# Patient Record
Sex: FEMALE | Race: BLACK OR AFRICAN AMERICAN | NOT HISPANIC OR LATINO | URBAN - METROPOLITAN AREA
[De-identification: names, ages, dates, MRNs, and addresses within clinical notes are randomized per-mention and may not be internally consistent; named-entity substitution may affect disease eponyms.]

---

## 2018-04-22 ENCOUNTER — EMERGENCY (EMERGENCY)
Facility: HOSPITAL | Age: 53
LOS: 0 days | Discharge: HOME | End: 2018-04-22
Attending: EMERGENCY MEDICINE
Payer: COMMERCIAL

## 2018-04-22 VITALS
HEART RATE: 77 BPM | SYSTOLIC BLOOD PRESSURE: 144 MMHG | OXYGEN SATURATION: 100 % | RESPIRATION RATE: 18 BRPM | HEIGHT: 66 IN | TEMPERATURE: 97 F | WEIGHT: 175.05 LBS | DIASTOLIC BLOOD PRESSURE: 86 MMHG

## 2018-04-22 DIAGNOSIS — J45.909 UNSPECIFIED ASTHMA, UNCOMPLICATED: ICD-10-CM

## 2018-04-22 DIAGNOSIS — S39.012A STRAIN OF MUSCLE, FASCIA AND TENDON OF LOWER BACK, INITIAL ENCOUNTER: ICD-10-CM

## 2018-04-22 DIAGNOSIS — Z79.51 LONG TERM (CURRENT) USE OF INHALED STEROIDS: ICD-10-CM

## 2018-04-22 DIAGNOSIS — Z04.1 ENCOUNTER FOR EXAMINATION AND OBSERVATION FOLLOWING TRANSPORT ACCIDENT: ICD-10-CM

## 2018-04-22 DIAGNOSIS — M54.5 LOW BACK PAIN: ICD-10-CM
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
## 2018-04-22 PROBLEM — Z00.00 ENCOUNTER FOR PREVENTIVE HEALTH EXAMINATION: Status: ACTIVE | Noted: 2018-04-22

## 2018-04-22 PROCEDURE — 99283 EMERGENCY DEPT VISIT LOW MDM: CPT

## 2018-04-22 RX ORDER — IBUPROFEN 200 MG
1 TABLET ORAL
Qty: 15 | Refills: 0 | OUTPATIENT
Start: 2018-04-22 | End: 2018-04-26

## 2018-04-22 RX ORDER — METHOCARBAMOL 500 MG/1
1 TABLET, FILM COATED ORAL
Qty: 15 | Refills: 0 | OUTPATIENT
Start: 2018-04-22 | End: 2018-04-26

## 2018-04-22 RX ORDER — IBUPROFEN 200 MG
600 TABLET ORAL ONCE
Qty: 0 | Refills: 0 | Status: COMPLETED | OUTPATIENT
Start: 2018-04-22 | End: 2018-04-22

## 2018-04-22 RX ORDER — METHOCARBAMOL 500 MG/1
1000 TABLET, FILM COATED ORAL ONCE
Qty: 0 | Refills: 0 | Status: COMPLETED | OUTPATIENT
Start: 2018-04-22 | End: 2018-04-22

## 2018-04-22 RX ADMIN — Medication 600 MILLIGRAM(S): at 20:56

## 2018-04-22 RX ADMIN — METHOCARBAMOL 1000 MILLIGRAM(S): 500 TABLET, FILM COATED ORAL at 20:56
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
## 2018-04-22 NOTE — ED PROVIDER NOTE - CONSTITUTIONAL, MLM
normal... Well appearing, well nourished, awake, alert, oriented to person, place, time/situation and in no apparent distress. Speaking in clear full sentences no nasal flaring no shoulders retractions not holding her head/chest/abdomen/back, appears very comfortable sitting up in the chair in a bright light room

## 2018-04-22 NOTE — ED PROVIDER NOTE - MEDICAL DECISION MAKING DETAILS
hx, exam, Pt has no point bony tenderness no xrays of cervical, thoracic and lumbar spines are indicated

## 2018-04-22 NOTE — ED PROVIDER NOTE - EXTREMITY EXAM
no deformity, pain or tenderness, no restriction of movement/Pt is able to flex and extend all joints against resistance of all extremities

## 2018-11-20 ENCOUNTER — TRANSCRIPTION ENCOUNTER (OUTPATIENT)
Age: 53
End: 2018-11-20

## 2018-11-20 ENCOUNTER — EMERGENCY (EMERGENCY)
Facility: HOSPITAL | Age: 53
LOS: 1 days | Discharge: HOME | End: 2018-11-20
Attending: EMERGENCY MEDICINE | Admitting: EMERGENCY MEDICINE
Payer: COMMERCIAL

## 2018-11-20 VITALS
OXYGEN SATURATION: 98 % | HEART RATE: 81 BPM | RESPIRATION RATE: 16 BRPM | SYSTOLIC BLOOD PRESSURE: 111 MMHG | TEMPERATURE: 97 F | DIASTOLIC BLOOD PRESSURE: 71 MMHG

## 2018-11-20 VITALS
RESPIRATION RATE: 16 BRPM | HEART RATE: 72 BPM | DIASTOLIC BLOOD PRESSURE: 94 MMHG | TEMPERATURE: 98 F | SYSTOLIC BLOOD PRESSURE: 151 MMHG | OXYGEN SATURATION: 99 % | HEIGHT: 66 IN | WEIGHT: 171.96 LBS

## 2018-11-20 PROBLEM — J45.909 UNSPECIFIED ASTHMA, UNCOMPLICATED: Chronic | Status: ACTIVE | Noted: 2018-04-22

## 2018-11-20 LAB
ALBUMIN SERPL ELPH-MCNC: 3.3 G/DL — SIGNIFICANT CHANGE UP (ref 3.3–5)
ALP SERPL-CCNC: 70 U/L — SIGNIFICANT CHANGE UP (ref 30–120)
ALT FLD-CCNC: 21 U/L DA — SIGNIFICANT CHANGE UP (ref 10–60)
ANION GAP SERPL CALC-SCNC: 8 MMOL/L — SIGNIFICANT CHANGE UP (ref 5–17)
APPEARANCE UR: CLEAR — SIGNIFICANT CHANGE UP
AST SERPL-CCNC: 18 U/L — SIGNIFICANT CHANGE UP (ref 10–40)
BACTERIA # UR AUTO: ABNORMAL
BASOPHILS # BLD AUTO: 0.02 K/UL — SIGNIFICANT CHANGE UP (ref 0–0.2)
BASOPHILS NFR BLD AUTO: 0.3 % — SIGNIFICANT CHANGE UP (ref 0–2)
BILIRUB SERPL-MCNC: 0.2 MG/DL — SIGNIFICANT CHANGE UP (ref 0.2–1.2)
BILIRUB UR-MCNC: NEGATIVE — SIGNIFICANT CHANGE UP
BUN SERPL-MCNC: 19 MG/DL — SIGNIFICANT CHANGE UP (ref 7–23)
CALCIUM SERPL-MCNC: 9 MG/DL — SIGNIFICANT CHANGE UP (ref 8.4–10.5)
CHLORIDE SERPL-SCNC: 104 MMOL/L — SIGNIFICANT CHANGE UP (ref 96–108)
CO2 SERPL-SCNC: 29 MMOL/L — SIGNIFICANT CHANGE UP (ref 22–31)
COLOR SPEC: YELLOW — SIGNIFICANT CHANGE UP
CREAT SERPL-MCNC: 0.88 MG/DL — SIGNIFICANT CHANGE UP (ref 0.5–1.3)
DIFF PNL FLD: ABNORMAL
EOSINOPHIL # BLD AUTO: 0.22 K/UL — SIGNIFICANT CHANGE UP (ref 0–0.5)
EOSINOPHIL NFR BLD AUTO: 3.3 % — SIGNIFICANT CHANGE UP (ref 0–6)
EPI CELLS # UR: ABNORMAL
GLUCOSE SERPL-MCNC: 88 MG/DL — SIGNIFICANT CHANGE UP (ref 70–99)
GLUCOSE UR QL: NEGATIVE MG/DL — SIGNIFICANT CHANGE UP
HCT VFR BLD CALC: 36.5 % — SIGNIFICANT CHANGE UP (ref 34.5–45)
HGB BLD-MCNC: 12.1 G/DL — SIGNIFICANT CHANGE UP (ref 11.5–15.5)
IMM GRANULOCYTES NFR BLD AUTO: 0.1 % — SIGNIFICANT CHANGE UP (ref 0–1.5)
KETONES UR-MCNC: ABNORMAL
LEUKOCYTE ESTERASE UR-ACNC: ABNORMAL
LIDOCAIN IGE QN: 150 U/L — SIGNIFICANT CHANGE UP (ref 73–393)
LYMPHOCYTES # BLD AUTO: 3.16 K/UL — SIGNIFICANT CHANGE UP (ref 1–3.3)
LYMPHOCYTES # BLD AUTO: 47.2 % — HIGH (ref 13–44)
MCHC RBC-ENTMCNC: 31.1 PG — SIGNIFICANT CHANGE UP (ref 27–34)
MCHC RBC-ENTMCNC: 33.2 GM/DL — SIGNIFICANT CHANGE UP (ref 32–36)
MCV RBC AUTO: 93.8 FL — SIGNIFICANT CHANGE UP (ref 80–100)
MONOCYTES # BLD AUTO: 0.57 K/UL — SIGNIFICANT CHANGE UP (ref 0–0.9)
MONOCYTES NFR BLD AUTO: 8.5 % — SIGNIFICANT CHANGE UP (ref 2–14)
NEUTROPHILS # BLD AUTO: 2.71 K/UL — SIGNIFICANT CHANGE UP (ref 1.8–7.4)
NEUTROPHILS NFR BLD AUTO: 40.6 % — LOW (ref 43–77)
NITRITE UR-MCNC: NEGATIVE — SIGNIFICANT CHANGE UP
NRBC # BLD: 0 /100 WBCS — SIGNIFICANT CHANGE UP (ref 0–0)
OB PNL STL: NEGATIVE — SIGNIFICANT CHANGE UP
PH UR: 5 — SIGNIFICANT CHANGE UP (ref 5–8)
PLATELET # BLD AUTO: 288 K/UL — SIGNIFICANT CHANGE UP (ref 150–400)
POTASSIUM SERPL-MCNC: 3.4 MMOL/L — LOW (ref 3.5–5.3)
POTASSIUM SERPL-SCNC: 3.4 MMOL/L — LOW (ref 3.5–5.3)
PROT SERPL-MCNC: 8 G/DL — SIGNIFICANT CHANGE UP (ref 6–8.3)
PROT UR-MCNC: 15 MG/DL
RBC # BLD: 3.89 M/UL — SIGNIFICANT CHANGE UP (ref 3.8–5.2)
RBC # FLD: 12.4 % — SIGNIFICANT CHANGE UP (ref 10.3–14.5)
RBC CASTS # UR COMP ASSIST: SIGNIFICANT CHANGE UP /HPF (ref 0–4)
SODIUM SERPL-SCNC: 141 MMOL/L — SIGNIFICANT CHANGE UP (ref 135–145)
SP GR SPEC: 1.02 — SIGNIFICANT CHANGE UP (ref 1.01–1.02)
UROBILINOGEN FLD QL: NEGATIVE MG/DL — SIGNIFICANT CHANGE UP
WBC # BLD: 6.69 K/UL — SIGNIFICANT CHANGE UP (ref 3.8–10.5)
WBC # FLD AUTO: 6.69 K/UL — SIGNIFICANT CHANGE UP (ref 3.8–10.5)
WBC UR QL: SIGNIFICANT CHANGE UP

## 2018-11-20 PROCEDURE — 93005 ELECTROCARDIOGRAM TRACING: CPT

## 2018-11-20 PROCEDURE — 93010 ELECTROCARDIOGRAM REPORT: CPT

## 2018-11-20 PROCEDURE — 83690 ASSAY OF LIPASE: CPT

## 2018-11-20 PROCEDURE — 36415 COLL VENOUS BLD VENIPUNCTURE: CPT

## 2018-11-20 PROCEDURE — 96375 TX/PRO/DX INJ NEW DRUG ADDON: CPT

## 2018-11-20 PROCEDURE — 80053 COMPREHEN METABOLIC PANEL: CPT

## 2018-11-20 PROCEDURE — 99284 EMERGENCY DEPT VISIT MOD MDM: CPT

## 2018-11-20 PROCEDURE — 96374 THER/PROPH/DIAG INJ IV PUSH: CPT | Mod: 59

## 2018-11-20 PROCEDURE — 96361 HYDRATE IV INFUSION ADD-ON: CPT

## 2018-11-20 PROCEDURE — 85027 COMPLETE CBC AUTOMATED: CPT

## 2018-11-20 PROCEDURE — 74177 CT ABD & PELVIS W/CONTRAST: CPT

## 2018-11-20 PROCEDURE — 81001 URINALYSIS AUTO W/SCOPE: CPT

## 2018-11-20 PROCEDURE — 74177 CT ABD & PELVIS W/CONTRAST: CPT | Mod: 26

## 2018-11-20 PROCEDURE — 82272 OCCULT BLD FECES 1-3 TESTS: CPT

## 2018-11-20 PROCEDURE — 99284 EMERGENCY DEPT VISIT MOD MDM: CPT | Mod: 25

## 2018-11-20 RX ORDER — CEFUROXIME AXETIL 250 MG
250 TABLET ORAL ONCE
Qty: 0 | Refills: 0 | Status: DISCONTINUED | OUTPATIENT
Start: 2018-11-20 | End: 2018-11-20

## 2018-11-20 RX ORDER — PANTOPRAZOLE SODIUM 20 MG/1
40 TABLET, DELAYED RELEASE ORAL ONCE
Qty: 0 | Refills: 0 | Status: COMPLETED | OUTPATIENT
Start: 2018-11-20 | End: 2018-11-20

## 2018-11-20 RX ORDER — ONDANSETRON 8 MG/1
4 TABLET, FILM COATED ORAL ONCE
Qty: 0 | Refills: 0 | Status: COMPLETED | OUTPATIENT
Start: 2018-11-20 | End: 2018-11-20

## 2018-11-20 RX ORDER — CEFUROXIME AXETIL 250 MG
1 TABLET ORAL
Qty: 10 | Refills: 0 | OUTPATIENT
Start: 2018-11-20 | End: 2018-11-24

## 2018-11-20 RX ORDER — SODIUM CHLORIDE 9 MG/ML
1000 INJECTION INTRAMUSCULAR; INTRAVENOUS; SUBCUTANEOUS ONCE
Qty: 0 | Refills: 0 | Status: COMPLETED | OUTPATIENT
Start: 2018-11-20 | End: 2018-11-20

## 2018-11-20 RX ORDER — CEFUROXIME AXETIL 250 MG
500 TABLET ORAL EVERY 12 HOURS
Qty: 0 | Refills: 0 | Status: DISCONTINUED | OUTPATIENT
Start: 2018-11-20 | End: 2018-11-24

## 2018-11-20 RX ADMIN — SODIUM CHLORIDE 1000 MILLILITER(S): 9 INJECTION INTRAMUSCULAR; INTRAVENOUS; SUBCUTANEOUS at 12:00

## 2018-11-20 RX ADMIN — Medication 500 MILLIGRAM(S): at 14:21

## 2018-11-20 RX ADMIN — ONDANSETRON 4 MILLIGRAM(S): 8 TABLET, FILM COATED ORAL at 10:56

## 2018-11-20 RX ADMIN — SODIUM CHLORIDE 1000 MILLILITER(S): 9 INJECTION INTRAMUSCULAR; INTRAVENOUS; SUBCUTANEOUS at 10:56

## 2018-11-20 RX ADMIN — PANTOPRAZOLE SODIUM 40 MILLIGRAM(S): 20 TABLET, DELAYED RELEASE ORAL at 10:56

## 2018-11-20 NOTE — ED PROVIDER NOTE - OBJECTIVE STATEMENT
54 y/o F with hx of asthma, HTN presents with c/o epigastric pain x 3 days. Pt states that she has burning sensation to her epigastric region and now to her LUQ, mild pain to upper back but non radiating. Pt admits to nausea. States that she noticed bright red blood upon wiping yesterday, initially stated that it was from the rectum after BM but in the ED noticed it was from her vagina. States that she saw her GYN last year, lmp was when she was 44 y/o and had a colonoscopy and endoscopy earlier this year with normal results as per pt. Pt denies fever, chills, v/d/constipation, CP, SOB, hx of abdominal surgeries, recent travel, sick contacts, dysuria, flank pain, blood in stool or other symptoms.  PMD: Dr. Don 52 y/o F with hx of asthma, HTN presents with c/o epigastric pain x 3 days. Pt states that she has burning sensation to her epigastric region and now to her LUQ, mild pain to upper back but non radiating. Pt admits to nausea. States that she noticed bright red blood upon wiping yesterday, initially stated that it was from the rectum after bowel movement but in the ED thinks it was from her vagina. States that she saw her GYN this year, lmp was when she was 48 y/o and had a colonoscopy and endoscopy earlier this year with normal results as per pt. Pt denies fever, chills, v/d/constipation, CP, SOB, hx of abdominal surgeries, recent travel, sick contacts, dysuria, flank pain, blood in stool or other symptoms.  PMD: Dr. Don

## 2018-11-20 NOTE — ED ADULT NURSE NOTE - ED COMFORT CARE
Patient informed Erythromycin Pregnancy And Lactation Text: This medication is Pregnancy Category B and is considered safe during pregnancy. It is also excreted in breast milk.

## 2018-11-20 NOTE — ED ADULT TRIAGE NOTE - CHIEF COMPLAINT QUOTE
" I have burning sensation in my epigastric area started yesterday, I saw blood on the tissue when I wiped after a BM, my upper back, my right side back/ flank pain "

## 2018-11-20 NOTE — ED PROVIDER NOTE - CARE PLAN
Principal Discharge DX:	Epigastric pain Principal Discharge DX:	Abdominal pain  Secondary Diagnosis:	Hematuria  Secondary Diagnosis:	Rectal bleeding

## 2018-11-20 NOTE — ED PROVIDER NOTE - MEDICAL DECISION MAKING DETAILS
54 y/o F with epigastric pain x 3 days with nausea and LUQ pain, also noticed blood from vaginal upon wiping since last night, no fever/vomiting/CP/SOB, will get ekg, labs, ua, pelvic exam, ct a/p, re-assess 54 y/o F with epigastric pain x 3 days with nausea and LUQ pain, also noticed blood from ?vagina upon wiping since last night, no fever/vomiting/CP/SOB, will get ekg, labs, ua, pelvic exam, ct a/p, re-assess

## 2018-11-20 NOTE — ED PROVIDER NOTE - ATTENDING CONTRIBUTION TO CARE
pt c/o epigastric abd pain and brbpr starting yest. pt reports saw brbpr when wiping. + mild nausea. no fevers, chills, ha, vomiting, cp, sob, constipation, diarrhea.  wd, wn female, nad, s1s2 rrr, lungs cta, abd soft, tender epigastric and luq, nt ruq, rlq, llq

## 2018-11-20 NOTE — ED ADULT NURSE NOTE - CHPI ED NUR SYMPTOMS NEG
no dysuria/no chills/no fever/no hematuria/no abdominal distension/no vomiting/no diarrhea/no burning urination

## 2018-11-20 NOTE — ED ADULT NURSE NOTE - OBJECTIVE STATEMENT
pt c.o epigastric pain, radiating to back and right shoulder, pt c/o slight nausea. denies any chest pain

## 2019-02-14 ENCOUNTER — TRANSCRIPTION ENCOUNTER (OUTPATIENT)
Age: 54
End: 2019-02-14

## 2019-02-14 ENCOUNTER — EMERGENCY (EMERGENCY)
Facility: HOSPITAL | Age: 54
LOS: 1 days | Discharge: ANOTHER HOSPITAL | End: 2019-02-14
Attending: EMERGENCY MEDICINE | Admitting: EMERGENCY MEDICINE
Payer: COMMERCIAL

## 2019-02-14 ENCOUNTER — INPATIENT (INPATIENT)
Facility: HOSPITAL | Age: 54
LOS: 0 days | Discharge: HOME | DRG: 287 | End: 2019-02-14
Attending: INTERNAL MEDICINE | Admitting: INTERNAL MEDICINE
Payer: COMMERCIAL

## 2019-02-14 VITALS
WEIGHT: 175.05 LBS | OXYGEN SATURATION: 98 % | HEART RATE: 86 BPM | TEMPERATURE: 99 F | HEIGHT: 66 IN | RESPIRATION RATE: 20 BRPM | SYSTOLIC BLOOD PRESSURE: 139 MMHG | DIASTOLIC BLOOD PRESSURE: 85 MMHG

## 2019-02-14 VITALS
HEIGHT: 66 IN | TEMPERATURE: 98 F | SYSTOLIC BLOOD PRESSURE: 125 MMHG | OXYGEN SATURATION: 97 % | DIASTOLIC BLOOD PRESSURE: 84 MMHG | RESPIRATION RATE: 20 BRPM | HEART RATE: 88 BPM | WEIGHT: 175.05 LBS

## 2019-02-14 VITALS
DIASTOLIC BLOOD PRESSURE: 64 MMHG | HEART RATE: 82 BPM | OXYGEN SATURATION: 99 % | SYSTOLIC BLOOD PRESSURE: 102 MMHG | RESPIRATION RATE: 14 BRPM

## 2019-02-14 DIAGNOSIS — I21.4 NON-ST ELEVATION (NSTEMI) MYOCARDIAL INFARCTION: ICD-10-CM

## 2019-02-14 PROBLEM — I10 ESSENTIAL (PRIMARY) HYPERTENSION: Chronic | Status: ACTIVE | Noted: 2018-11-20

## 2019-02-14 LAB
ALBUMIN SERPL ELPH-MCNC: 3.2 G/DL — LOW (ref 3.3–5)
ALP SERPL-CCNC: 90 U/L — SIGNIFICANT CHANGE UP (ref 40–120)
ALT FLD-CCNC: 17 U/L — SIGNIFICANT CHANGE UP (ref 12–78)
ANION GAP SERPL CALC-SCNC: 7 MMOL/L — SIGNIFICANT CHANGE UP (ref 5–17)
APPEARANCE UR: CLEAR — SIGNIFICANT CHANGE UP
APTT BLD: 30.2 SEC — SIGNIFICANT CHANGE UP (ref 27.5–36.3)
AST SERPL-CCNC: 20 U/L — SIGNIFICANT CHANGE UP (ref 15–37)
BASOPHILS # BLD AUTO: 0.01 K/UL — SIGNIFICANT CHANGE UP (ref 0–0.2)
BASOPHILS NFR BLD AUTO: 0.1 % — SIGNIFICANT CHANGE UP (ref 0–2)
BILIRUB SERPL-MCNC: 0.5 MG/DL — SIGNIFICANT CHANGE UP (ref 0.2–1.2)
BILIRUB UR-MCNC: NEGATIVE — SIGNIFICANT CHANGE UP
BUN SERPL-MCNC: 14 MG/DL — SIGNIFICANT CHANGE UP (ref 7–23)
CALCIUM SERPL-MCNC: 8.5 MG/DL — SIGNIFICANT CHANGE UP (ref 8.5–10.1)
CHLORIDE SERPL-SCNC: 105 MMOL/L — SIGNIFICANT CHANGE UP (ref 96–108)
CO2 SERPL-SCNC: 28 MMOL/L — SIGNIFICANT CHANGE UP (ref 22–31)
COLOR SPEC: YELLOW — SIGNIFICANT CHANGE UP
CREAT SERPL-MCNC: 0.79 MG/DL — SIGNIFICANT CHANGE UP (ref 0.5–1.3)
D DIMER BLD IA.RAPID-MCNC: 252 NG/ML DDU — HIGH
DIFF PNL FLD: ABNORMAL
EOSINOPHIL # BLD AUTO: 0.22 K/UL — SIGNIFICANT CHANGE UP (ref 0–0.5)
EOSINOPHIL NFR BLD AUTO: 2.7 % — SIGNIFICANT CHANGE UP (ref 0–6)
FLU A RESULT: SIGNIFICANT CHANGE UP
FLU A RESULT: SIGNIFICANT CHANGE UP
FLUAV AG NPH QL: SIGNIFICANT CHANGE UP
FLUBV AG NPH QL: SIGNIFICANT CHANGE UP
GLUCOSE SERPL-MCNC: 97 MG/DL — SIGNIFICANT CHANGE UP (ref 70–99)
GLUCOSE UR QL: NEGATIVE — SIGNIFICANT CHANGE UP
HCT VFR BLD CALC: 38 % — SIGNIFICANT CHANGE UP (ref 34.5–45)
HGB BLD-MCNC: 12.6 G/DL — SIGNIFICANT CHANGE UP (ref 11.5–15.5)
IMM GRANULOCYTES NFR BLD AUTO: 0.2 % — SIGNIFICANT CHANGE UP (ref 0–1.5)
INR BLD: 1.08 RATIO — SIGNIFICANT CHANGE UP (ref 0.88–1.16)
KETONES UR-MCNC: NEGATIVE — SIGNIFICANT CHANGE UP
LEUKOCYTE ESTERASE UR-ACNC: NEGATIVE — SIGNIFICANT CHANGE UP
LYMPHOCYTES # BLD AUTO: 1.5 K/UL — SIGNIFICANT CHANGE UP (ref 1–3.3)
LYMPHOCYTES # BLD AUTO: 18.1 % — SIGNIFICANT CHANGE UP (ref 13–44)
MAGNESIUM SERPL-MCNC: 1.9 MG/DL — SIGNIFICANT CHANGE UP (ref 1.6–2.6)
MCHC RBC-ENTMCNC: 30.9 PG — SIGNIFICANT CHANGE UP (ref 27–34)
MCHC RBC-ENTMCNC: 33.2 GM/DL — SIGNIFICANT CHANGE UP (ref 32–36)
MCV RBC AUTO: 93.1 FL — SIGNIFICANT CHANGE UP (ref 80–100)
MONOCYTES # BLD AUTO: 0.76 K/UL — SIGNIFICANT CHANGE UP (ref 0–0.9)
MONOCYTES NFR BLD AUTO: 9.2 % — SIGNIFICANT CHANGE UP (ref 2–14)
NEUTROPHILS # BLD AUTO: 5.79 K/UL — SIGNIFICANT CHANGE UP (ref 1.8–7.4)
NEUTROPHILS NFR BLD AUTO: 69.7 % — SIGNIFICANT CHANGE UP (ref 43–77)
NITRITE UR-MCNC: NEGATIVE — SIGNIFICANT CHANGE UP
NRBC # BLD: 0 /100 WBCS — SIGNIFICANT CHANGE UP (ref 0–0)
NT-PROBNP SERPL-SCNC: 74 PG/ML — SIGNIFICANT CHANGE UP (ref 0–125)
PH UR: 5 — SIGNIFICANT CHANGE UP (ref 5–8)
PLATELET # BLD AUTO: 300 K/UL — SIGNIFICANT CHANGE UP (ref 150–400)
POTASSIUM SERPL-MCNC: 3.6 MMOL/L — SIGNIFICANT CHANGE UP (ref 3.5–5.3)
POTASSIUM SERPL-SCNC: 3.6 MMOL/L — SIGNIFICANT CHANGE UP (ref 3.5–5.3)
PROT SERPL-MCNC: 8.2 G/DL — SIGNIFICANT CHANGE UP (ref 6–8.3)
PROT UR-MCNC: NEGATIVE — SIGNIFICANT CHANGE UP
PROTHROM AB SERPL-ACNC: 12.2 SEC — SIGNIFICANT CHANGE UP (ref 10–12.9)
RBC # BLD: 4.08 M/UL — SIGNIFICANT CHANGE UP (ref 3.8–5.2)
RBC # FLD: 12.5 % — SIGNIFICANT CHANGE UP (ref 10.3–14.5)
RSV RESULT: SIGNIFICANT CHANGE UP
RSV RNA RESP QL NAA+PROBE: SIGNIFICANT CHANGE UP
SODIUM SERPL-SCNC: 140 MMOL/L — SIGNIFICANT CHANGE UP (ref 135–145)
SP GR SPEC: 1.01 — SIGNIFICANT CHANGE UP (ref 1.01–1.02)
TROPONIN I SERPL-MCNC: 0.38 NG/ML — HIGH (ref 0.01–0.04)
TROPONIN I SERPL-MCNC: 0.74 NG/ML — HIGH (ref 0.01–0.04)
TSH SERPL-MCNC: 1.8 UIU/ML — SIGNIFICANT CHANGE UP (ref 0.36–3.74)
UROBILINOGEN FLD QL: NEGATIVE — SIGNIFICANT CHANGE UP
WBC # BLD: 8.3 K/UL — SIGNIFICANT CHANGE UP (ref 3.8–10.5)
WBC # FLD AUTO: 8.3 K/UL — SIGNIFICANT CHANGE UP (ref 3.8–10.5)

## 2019-02-14 PROCEDURE — 87086 URINE CULTURE/COLONY COUNT: CPT

## 2019-02-14 PROCEDURE — 36415 COLL VENOUS BLD VENIPUNCTURE: CPT

## 2019-02-14 PROCEDURE — 71046 X-RAY EXAM CHEST 2 VIEWS: CPT

## 2019-02-14 PROCEDURE — 93005 ELECTROCARDIOGRAM TRACING: CPT

## 2019-02-14 PROCEDURE — 85610 PROTHROMBIN TIME: CPT

## 2019-02-14 PROCEDURE — 99152 MOD SED SAME PHYS/QHP 5/>YRS: CPT

## 2019-02-14 PROCEDURE — 85730 THROMBOPLASTIN TIME PARTIAL: CPT

## 2019-02-14 PROCEDURE — 93458 L HRT ARTERY/VENTRICLE ANGIO: CPT

## 2019-02-14 PROCEDURE — 93458 L HRT ARTERY/VENTRICLE ANGIO: CPT | Mod: 26,GC

## 2019-02-14 PROCEDURE — 71275 CT ANGIOGRAPHY CHEST: CPT | Mod: 26

## 2019-02-14 PROCEDURE — 83735 ASSAY OF MAGNESIUM: CPT

## 2019-02-14 PROCEDURE — 76937 US GUIDE VASCULAR ACCESS: CPT | Mod: 26,GC

## 2019-02-14 PROCEDURE — 94640 AIRWAY INHALATION TREATMENT: CPT

## 2019-02-14 PROCEDURE — 87631 RESP VIRUS 3-5 TARGETS: CPT

## 2019-02-14 PROCEDURE — 85379 FIBRIN DEGRADATION QUANT: CPT

## 2019-02-14 PROCEDURE — 96374 THER/PROPH/DIAG INJ IV PUSH: CPT | Mod: 59

## 2019-02-14 PROCEDURE — 84484 ASSAY OF TROPONIN QUANT: CPT

## 2019-02-14 PROCEDURE — 81001 URINALYSIS AUTO W/SCOPE: CPT

## 2019-02-14 PROCEDURE — 93010 ELECTROCARDIOGRAM REPORT: CPT | Mod: 76

## 2019-02-14 PROCEDURE — 84443 ASSAY THYROID STIM HORMONE: CPT

## 2019-02-14 PROCEDURE — C1894: CPT

## 2019-02-14 PROCEDURE — C1887: CPT

## 2019-02-14 PROCEDURE — 93010 ELECTROCARDIOGRAM REPORT: CPT

## 2019-02-14 PROCEDURE — 99291 CRITICAL CARE FIRST HOUR: CPT

## 2019-02-14 PROCEDURE — 83880 ASSAY OF NATRIURETIC PEPTIDE: CPT

## 2019-02-14 PROCEDURE — 85027 COMPLETE CBC AUTOMATED: CPT

## 2019-02-14 PROCEDURE — 71046 X-RAY EXAM CHEST 2 VIEWS: CPT | Mod: 26

## 2019-02-14 PROCEDURE — 99285 EMERGENCY DEPT VISIT HI MDM: CPT

## 2019-02-14 PROCEDURE — C1769: CPT

## 2019-02-14 PROCEDURE — 99152 MOD SED SAME PHYS/QHP 5/>YRS: CPT | Mod: GC

## 2019-02-14 PROCEDURE — 80053 COMPREHEN METABOLIC PANEL: CPT

## 2019-02-14 PROCEDURE — 76937 US GUIDE VASCULAR ACCESS: CPT

## 2019-02-14 PROCEDURE — 99285 EMERGENCY DEPT VISIT HI MDM: CPT | Mod: 25

## 2019-02-14 PROCEDURE — 71275 CT ANGIOGRAPHY CHEST: CPT

## 2019-02-14 RX ORDER — ALBUTEROL 90 UG/1
0 AEROSOL, METERED ORAL
Qty: 0 | Refills: 0 | COMMUNITY

## 2019-02-14 RX ORDER — KETOROLAC TROMETHAMINE 30 MG/ML
30 SYRINGE (ML) INJECTION ONCE
Qty: 0 | Refills: 0 | Status: DISCONTINUED | OUTPATIENT
Start: 2019-02-14 | End: 2019-02-14

## 2019-02-14 RX ORDER — ACETAMINOPHEN 500 MG
650 TABLET ORAL ONCE
Qty: 0 | Refills: 0 | Status: DISCONTINUED | OUTPATIENT
Start: 2019-02-14 | End: 2019-02-14

## 2019-02-14 RX ORDER — MONTELUKAST 4 MG/1
1 TABLET, CHEWABLE ORAL
Qty: 0 | Refills: 0 | COMMUNITY

## 2019-02-14 RX ORDER — LEVALBUTEROL 1.25 MG/.5ML
0.63 SOLUTION, CONCENTRATE RESPIRATORY (INHALATION) EVERY 6 HOURS
Qty: 0 | Refills: 0 | Status: DISCONTINUED | OUTPATIENT
Start: 2019-02-14 | End: 2019-02-14

## 2019-02-14 RX ORDER — HEXAVITAMINS
1 TABLET ORAL
Qty: 0 | Refills: 0 | COMMUNITY

## 2019-02-14 RX ORDER — CLOPIDOGREL BISULFATE 75 MG/1
600 TABLET, FILM COATED ORAL ONCE
Qty: 0 | Refills: 0 | Status: COMPLETED | OUTPATIENT
Start: 2019-02-14 | End: 2019-02-14

## 2019-02-14 RX ORDER — IPRATROPIUM/ALBUTEROL SULFATE 18-103MCG
3 AEROSOL WITH ADAPTER (GRAM) INHALATION ONCE
Qty: 0 | Refills: 0 | Status: COMPLETED | OUTPATIENT
Start: 2019-02-14 | End: 2019-02-14

## 2019-02-14 RX ORDER — AMLODIPINE BESYLATE 2.5 MG/1
1 TABLET ORAL
Qty: 0 | Refills: 0 | COMMUNITY

## 2019-02-14 RX ORDER — ASPIRIN/CALCIUM CARB/MAGNESIUM 324 MG
324 TABLET ORAL DAILY
Qty: 0 | Refills: 0 | Status: DISCONTINUED | OUTPATIENT
Start: 2019-02-14 | End: 2019-02-18

## 2019-02-14 RX ADMIN — Medication 3 MILLILITER(S): at 06:02

## 2019-02-14 RX ADMIN — Medication 30 MILLIGRAM(S): at 07:28

## 2019-02-14 RX ADMIN — CLOPIDOGREL BISULFATE 600 MILLIGRAM(S): 75 TABLET, FILM COATED ORAL at 12:58

## 2019-02-14 RX ADMIN — Medication 30 MILLIGRAM(S): at 06:02

## 2019-02-14 RX ADMIN — Medication 324 MILLIGRAM(S): at 08:09

## 2019-02-14 NOTE — DISCHARGE NOTE ADULT - PATIENT PORTAL LINK FT
You can access the [facility] Patient Portal, offered by [facility], by registering with the following website: [email or web address]

## 2019-02-14 NOTE — ED PROVIDER NOTE - NS ED ROS FT
no weight change, no fever or chills  no recent travel, no recent abox, no sick contacts  no recent change in medications  no rash, no bruises  no visual changes no eye discharge  no cough cold or congestion,   no sob, + chest pain  h/o stress test, was neg  no orthopnea, no pnd  no abd pain, no n/v/d  no hematuria, no change in urinary habits  no joint pain, no deformity  no headache, no paresthesia

## 2019-02-14 NOTE — H&P CARDIOLOGY - HISTORY OF PRESENT ILLNESS
Pt is a 55 y/o F with PMHx HTN, asthma, states woke up with acute sharp stabbing chest pain that radiates to her upper left arm and upper back. Likely atypical chest pain due to pain on inspiration. D-dimer elevated at 252, CTA chest neg,  Trops elevated at 0.382 and repeat 0.742, pt to be transferred to [place] for emergent angio due to r/o ischemia. Patient asymptomatic at this time. Initial EKG showed sinus rhythm with short NM otherwise normal, repeat EKG showed NSR with no abnormalities. CXR negative.

## 2019-02-14 NOTE — DISCHARGE NOTE ADULT - PLAN OF CARE
to be free from chest pain No heavy lifting for 2 weeks, no strenuous activity  or uneccesary stair climbing, no driving for x 2 days,  you may shower 24 hours following procedure but no bathing or swimming for x1  week, no bending, no straining while having a Bowel movement, No strenuous sexual activity for x 1 week check groin for bleeding, pain, tightness or ( golf ball size)  swelling daily following procedure , & follow up with your cardiologist in 1-2 week

## 2019-02-14 NOTE — CONSULT NOTE ADULT - ATTENDING COMMENTS
Chart reviewed    Patient seen and examined    Agree with plan as outlined above Chart reviewed    Patient seen and examined    Agree with plan as outlined above    Patient with chest pain since this morning and cardiac enzymes suggestive of a non-ST segment elevation myocardial infarction. I discussed this extensively with the patient at and we made arrangements for urgent transfer to [facility] for cardiac catheterization to better define her coronary anatomy and need for further intervention. She has been given aspirin and Plavix as dual antiplatelet therapy. 45 minutes of critical care medicine was spent directly with the patient and seen as well as coordination of transferred to [place]

## 2019-02-14 NOTE — CONSULT NOTE ADULT - ASSESSMENT
Pt is a 53 y/o F with PMHx HTN, asthma, states woke up with acute chest pain, sharp, stabbing, that she also feels in her back, no radiation. Doubt ACS, pain likely 2/2 GI pathology vs cardiac.     - Evidence of ischemia, trops? will f/u third set, pt asymptomatic  - CKs? flat versus trending up: argue for/against acute ischemia  - Evidence of volume overload   - Changes on EKG compared to previous  - Previous ECHO on _______ shows _______________ with EF: __%, ______________ valvular disease noted  - BP   , continue/hold home BP meds, monitor routine hemodynamics  - Monitor and replete lytes, keep K>4, Mg>2  - Pt has no evidence of ischemia, decompensated HF, unstable arrythmia, or severe stenotic valvular disease, and in the setting of _____ risk  procedure, patient is optimized from cardiovascular standpoint to proceed with planned procedure with routine hemodynamic monitoring.   - Pt has no modifiable active cardiac risk factors (elevated tropx3, moderate-severe ASD), and in the setting of  ______ procedure, patient is optimized as best as possible from cardiovascular standpoint to proceed with planned procedure with routine hemodynamic monitoring.   - Other cardiovascular workup will depend on clinical course.  - All other workup per primary team  - Will follow Pt is a 53 y/o F with PMHx HTN, asthma, states woke up with acute sharp stabbing chest pain that radiates to her upper left arm and upper back. Likely atypical chest pain due to pain on inspiration. D-dimer elevated at 252, CTA chest neg, so PE unlikely. Trops elevated at 0.382 and repeat 0.742, pt to be transferred to [place] for emergent angio due to r/o ischemia. Patient asymptomatic at this time. /75, continue/hold home BP meds, monitor routine hemodynamics. Initial EKG showed sinus rhythm with short AL otherwise normal, repeat EKG showed NSR with no abnormalities. No prior EKG recs. CXR negative. Pt has no evidence decompensated HF, unstable arrythmia, or severe stenotic valvular disease. Other cardiovascular workup will depend on clinical course. Will f/u cath results and f/u with our office outpt.       - aspirin 324 and clopidogrel 600 mg started  - continue duonebs   - Transfer to [place] for angio, will f/u cath results

## 2019-02-14 NOTE — ED PROVIDER NOTE - NSBENEFITOFTRANSFER_ED_A_ED
Continuity of Care at Other Facility/Obtain Level of Care/Service Not Available at this Facility/Worsening of Condition, Death, or Disability if Patient Does Not Transfer

## 2019-02-14 NOTE — DISCHARGE NOTE ADULT - CARE PLAN
Principal Discharge DX:	Chest pain  Goal:	to be free from chest pain  Assessment and plan of treatment:	No heavy lifting for 2 weeks, no strenuous activity  or uneccesary stair climbing, no driving for x 2 days,  you may shower 24 hours following procedure but no bathing or swimming for x1  week, no bending, no straining while having a Bowel movement, No strenuous sexual activity for x 1 week check groin for bleeding, pain, tightness or ( golf ball size)  swelling daily following procedure , & follow up with your cardiologist in 1-2 week

## 2019-02-14 NOTE — CONSULT NOTE ADULT - SUBJECTIVE AND OBJECTIVE BOX
[facility] - Vance Lawson, Lorena, Dorothy, Tanna, Bandar Brink  Office Number: [phone]    Initial Consult Note    CHIEF COMPLAINT: Patient is a 54y old  Female who presents with a chief complaint of SOB, CP    HPI: Pt is a 55 y/o F with PMHx HTN, asthma, states woke up with acute chest pain, sharp, stabbing, that she also feels in her back, no radiation. Last stress test less than 6months ago, no nausea, no vomiting, was sob, aggravated with inspiration, no relieving factors, was wheezing on ems arrival recd a breathing treatment felt better.      Pt states she was previously seen for a chest pain and rectal bleeding at , after viral syndrome. Pt refused ECG and FS at that time, as she preferred a full workup at [place] [facility]. At [place] [facility], pt c/o epigastric pain for last 3 days radiating to LUQ and upper back. EKG and CT were grossly unremarkable at this time. It was unclear whether bleeding was in fact rectal vs postmenopausal VB. Pt improved during ED course, and was DC'ed on cefuroxime.        PAST MEDICAL & SURGICAL HISTORY:  Hypertension  Asthma      SOCIAL HISTORY:  No tobacco, ethanol, or drug abuse.    FAMILY HISTORY:    No family history of acute MI or sudden cardiac death.    MEDICATIONS  (STANDING):  aspirin enteric coated 324 milliGRAM(s) Oral daily    MEDICATIONS  (PRN):      Allergies    No Known Allergies    Intolerances        REVIEW OF SYSTEMS:    CONSTITUTIONAL: No weakness, fevers or chills  EYES/ENT: No visual changes;  No vertigo or throat pain   NECK: No pain or stiffness  RESPIRATORY: No cough, wheezing, hemoptysis; No shortness of breath  CARDIOVASCULAR: No chest pain or palpitations  GASTROINTESTINAL: No abdominal pain. No nausea, vomiting, or hematemesis; No diarrhea or constipation. No melena or hematochezia.  GENITOURINARY: No dysuria, frequency or hematuria  NEUROLOGICAL: No numbness or weakness  SKIN: No itching or rash  All other review of systems is negative unless indicated above    VITAL SIGNS:   Vital Signs Last 24 Hrs  T(C): 36.8 (14 Feb 2019 07:27), Max: 36.8 (14 Feb 2019 07:27)  T(F): 98.3 (14 Feb 2019 07:27), Max: 98.3 (14 Feb 2019 07:27)  HR: 76 (14 Feb 2019 07:27) (76 - 88)  BP: 110/75 (14 Feb 2019 07:27) (110/75 - 125/84)  BP(mean): --  RR: 15 (14 Feb 2019 07:27) (15 - 20)  SpO2: 100% (14 Feb 2019 07:27) (97% - 100%)    I&O's Summary      On Exam:    Constitutional: NAD, alert and oriented x 3  Lungs:  Non-labored, breath sounds are clear bilaterally, No wheezing, rales or rhonchi  Cardiovascular: RRR.  S1 and S2 positive.  No murmurs, rubs, gallops or clicks  Gastrointestinal: Bowel Sounds present, soft, nontender.   Lymph: No peripheral edema. No cervical lymphadenopathy.  Neurological: Alert, no focal deficits  Skin: No rashes or ulcers   Psych:  Mood & affect appropriate.    LABS: All Labs Reviewed:                        12.6   8.30  )-----------( 300      ( 14 Feb 2019 06:21 )             38.0     14 Feb 2019 06:21    140    |  105    |  14     ----------------------------<  97     3.6     |  28     |  0.79     Ca    8.5        14 Feb 2019 06:21  Mg     1.9       14 Feb 2019 06:21    TPro  8.2    /  Alb  3.2    /  TBili  0.5    /  DBili  x      /  AST  20     /  ALT  17     /  AlkPhos  90     14 Feb 2019 06:21    PT/INR - ( 14 Feb 2019 06:21 )   PT: 12.2 sec;   INR: 1.08 ratio         PTT - ( 14 Feb 2019 06:21 )  PTT:30.2 sec  CARDIAC MARKERS ( 14 Feb 2019 06:21 )  .382 ng/mL / x     / x     / x     / x          Blood Culture:   02-14 @ 06:21  Pro Bnp 74    02-14 @ 06:21  TSH: 1.80      RADIOLOGY:    EKG: [facility] - Vance Lawson, Lorena, Dorothy, Tanna, Bandar Brink  Office Number: [phone]    Initial Consult Note    CHIEF COMPLAINT: Patient is a 54y old Female who presents with a chief complaint of SOB and chest pain.     HPI: Pt is a 53 y/o F with PMHx HTN and asthma presented to the ED after she woke up at 4am this morning with sharp stabbing chest pain that she rates 10/10 in severity that radiated to her upper left arm and upper back. Reports pain aggravated with inspiration. She states the pain worsened with lying down. She took nitroglycerin when the chest pain began but which didn't improve her symptoms. She also reports wheezing and SOB but received a breathing treatment by EMS and breathing has improved since. Pain has also improved greatly since as patient rates it 2/10 in severity and wants to go home. Last stress test less than 6 months ago and patient reports normal (pending records from [facility]D). Patient denies nausea, vomiting, dizziness SOB and palpitations.     Pt states she was previously seen for a chest pain and rectal bleeding at , after viral syndrome. Pt refused ECG and FS at that time, as she preferred a full workup at [place] [facility]. At [place] [facility], pt c/o epigastric pain for last 3 days radiating to LUQ and upper back. EKG and CT were grossly unremarkable at this time. It was unclear whether bleeding was in fact rectal vs postmenopausal VB. Pt improved during ED course, and was DC'ed on cefuroxime.  Patient has not had any episodes of bleeding or epigastric pain since.       PAST MEDICAL & SURGICAL HISTORY:  Hypertension  Asthma      SOCIAL HISTORY:  No tobacco, ethanol, or drug abuse.    FAMILY HISTORY:    Father had cardiac arrest at 50. Mother has HTN.     MEDICATIONS  (STANDING):  aspirin enteric coated 324 milliGRAM(s) Oral daily    MEDICATIONS  (PRN):      Allergies    No Known Allergies    Intolerances        REVIEW OF SYSTEMS:    CONSTITUTIONAL: No weakness, fevers or chills  EYES/ENT: No visual changes;  No vertigo or throat pain   NECK: No pain or stiffness  RESPIRATORY: No cough, wheezing, hemoptysis; No shortness of breath  CARDIOVASCULAR: Mild chest pain, no palpitations  GASTROINTESTINAL: No abdominal pain. No nausea, vomiting, or hematemesis; No diarrhea or constipation. No melena or hematochezia.  GENITOURINARY: No dysuria, frequency or hematuria  NEUROLOGICAL: No numbness or weakness  All other review of systems is negative unless indicated above    VITAL SIGNS:   Vital Signs Last 24 Hrs  T(C): 36.8 (14 Feb 2019 07:27), Max: 36.8 (14 Feb 2019 07:27)  T(F): 98.3 (14 Feb 2019 07:27), Max: 98.3 (14 Feb 2019 07:27)  HR: 76 (14 Feb 2019 07:27) (76 - 88)  BP: 110/75 (14 Feb 2019 07:27) (110/75 - 125/84)  BP(mean): --  RR: 15 (14 Feb 2019 07:27) (15 - 20)  SpO2: 100% (14 Feb 2019 07:27) (97% - 100%)    I&O's Summary      On Exam:    Constitutional: NAD, alert and oriented x 3, lying comfortably in bed  Lungs:  Non-labored, breath sounds are clear bilaterally, No wheezing, rales or rhonchi  Cardiovascular: RRR.  S1 and S2 positive.  No murmurs, rubs, gallops or clicks  Gastrointestinal: Bowel Sounds present, soft, nontender.   Lymph: No peripheral edema. No cervical lymphadenopathy.  Neurological: Alert, no focal deficits  Skin: No rashes or ulcers     LABS: All Labs Reviewed:                        12.6   8.30  )-----------( 300      ( 14 Feb 2019 06:21 )             38.0     14 Feb 2019 06:21    140    |  105    |  14     ----------------------------<  97     3.6     |  28     |  0.79     Ca    8.5        14 Feb 2019 06:21  Mg     1.9       14 Feb 2019 06:21    TPro  8.2    /  Alb  3.2    /  TBili  0.5    /  DBili  x      /  AST  20     /  ALT  17     /  AlkPhos  90     14 Feb 2019 06:21    PT/INR - ( 14 Feb 2019 06:21 )   PT: 12.2 sec;   INR: 1.08 ratio         PTT - ( 14 Feb 2019 06:21 )  PTT:30.2 sec  CARDIAC MARKERS ( 14 Feb 2019 06:21 )  .382 ng/mL / x     / x     / x     / x          Blood Culture:   02-14 @ 06:21  Pro Bnp 74    02-14 @ 06:21  TSH: 1.80      RADIOLOGY:    EKG: Intial EKG showed sinus rhythm with short OR, otherwise normal. Repeat EKG showed NSR and no abnormalities.      CT angio: Negative for PE. [facility] - Vance Lawson, Lorena, Dorothy, Tanna, Bandar Brink  Office Number: [phone]    Initial Consult Note    CHIEF COMPLAINT: Patient is a 54y old Female who presents with a chief complaint of SOB and chest pain.     HPI: Pt is a 53 y/o F with PMHx HTN and asthma presented to the ED after she woke up at 4am this morning with sharp stabbing chest pain that she rates 10/10 in severity that radiated to her upper left arm and upper back. Reports pain aggravated with inspiration. She states the pain worsened with lying down. She took nitroglycerin when the chest pain began but which didn't improve her symptoms. She also reports wheezing and SOB but received a breathing treatment by EMS and breathing has improved since. Pt states pain has also improved greatly since then, as patient rates it 2/10 in severity. Last stress test less than 6 months ago and per records from PMD, stress test was unremarkable. Patient denies nausea, vomiting, dizziness, SOB and palpitations.     Pt states she was previously seen for a chest pain and rectal bleeding at an urgent care center, after viral syndrome. Pt refused ECG and FS at that time, as she preferred a full workup at [place] [facility]. At [place] [facility], pt c/o epigastric pain for last 3 days radiating to LUQ and upper back. EKG and CT were grossly unremarkable at this time. It was unclear whether bleeding was in fact rectal vs postmenopausal VB. Pt improved during ED course, and was DC'ed on cefuroxime.  Patient has not had any episodes of bleeding or epigastric pain since.       PAST MEDICAL & SURGICAL HISTORY:  Hypertension  Asthma      SOCIAL HISTORY:  No tobacco, ethanol, or drug abuse.    FAMILY HISTORY:    Father had cardiac arrest at 50. Mother has HTN.     MEDICATIONS  (STANDING):  aspirin enteric coated 324 milliGRAM(s) Oral daily    MEDICATIONS  (PRN):      Allergies    No Known Allergies    Intolerances        REVIEW OF SYSTEMS:    CONSTITUTIONAL: No weakness, fevers or chills  EYES/ENT: No visual changes;  No vertigo or throat pain   NECK: No pain or stiffness  RESPIRATORY: No cough, wheezing, hemoptysis; No shortness of breath  CARDIOVASCULAR: Mild chest pain, no palpitations  GASTROINTESTINAL: No abdominal pain. No nausea, vomiting, or hematemesis; No diarrhea or constipation. No melena or hematochezia.  GENITOURINARY: No dysuria, frequency or hematuria  NEUROLOGICAL: No numbness or weakness  All other review of systems is negative unless indicated above    VITAL SIGNS:   Vital Signs Last 24 Hrs  T(C): 36.8 (14 Feb 2019 07:27), Max: 36.8 (14 Feb 2019 07:27)  T(F): 98.3 (14 Feb 2019 07:27), Max: 98.3 (14 Feb 2019 07:27)  HR: 76 (14 Feb 2019 07:27) (76 - 88)  BP: 110/75 (14 Feb 2019 07:27) (110/75 - 125/84)  BP(mean): --  RR: 15 (14 Feb 2019 07:27) (15 - 20)  SpO2: 100% (14 Feb 2019 07:27) (97% - 100%)    I&O's Summary      On Exam:    Constitutional: NAD, alert and oriented x 3, lying comfortably in bed  Lungs:  Non-labored, breath sounds are clear bilaterally, No wheezing, rales or rhonchi  Cardiovascular: RRR.  S1 and S2 positive.  No murmurs, rubs, gallops or clicks  Gastrointestinal: Bowel Sounds present, soft, nontender.   Lymph: No peripheral edema. No cervical lymphadenopathy.  Neurological: Alert, no focal deficits  Skin: No rashes or ulcers     LABS: All Labs Reviewed:                        12.6   8.30  )-----------( 300      ( 14 Feb 2019 06:21 )             38.0     14 Feb 2019 06:21    140    |  105    |  14     ----------------------------<  97     3.6     |  28     |  0.79     Ca    8.5        14 Feb 2019 06:21  Mg     1.9       14 Feb 2019 06:21    TPro  8.2    /  Alb  3.2    /  TBili  0.5    /  DBili  x      /  AST  20     /  ALT  17     /  AlkPhos  90     14 Feb 2019 06:21    PT/INR - ( 14 Feb 2019 06:21 )   PT: 12.2 sec;   INR: 1.08 ratio         PTT - ( 14 Feb 2019 06:21 )  PTT:30.2 sec  CARDIAC MARKERS ( 14 Feb 2019 06:21 )  .382 ng/mL / x     / x     / x     / x          Blood Culture:   02-14 @ 06:21  Pro Bnp 74    02-14 @ 06:21  TSH: 1.80      RADIOLOGY:    EKG: Intial EKG showed sinus rhythm with short OK, otherwise normal. Repeat EKG showed NSR and no abnormalities.      CT angio: Negative for PE.

## 2019-02-14 NOTE — ED ADULT NURSE REASSESSMENT NOTE - NS ED NURSE REASSESS COMMENT FT1
Patient complaining of urge to urinate. Patient placed on bed pan with no results. Dr. Acharya made aware. Patient bladder scan fro greater than 714 ml. Patient ambulated to commode and able to void 1000 ml. Tanner ALBARRAN

## 2019-02-14 NOTE — ED ADULT NURSE NOTE - NSIMPLEMENTINTERV_GEN_ALL_ED
Implemented All Universal Safety Interventions:  [place] to call system. Call bell, personal items and telephone within reach. Instruct patient to call for assistance. Room bathroom lighting operational. Non-slip footwear when patient is off stretcher. Physically safe environment: no spills, clutter or unnecessary equipment. Stretcher in lowest position, wheels locked, appropriate side rails in place.

## 2019-02-14 NOTE — ED PROVIDER NOTE - CLINICAL SUMMARY MEDICAL DECISION MAKING FREE TEXT BOX
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

## 2019-02-14 NOTE — DISCHARGE NOTE ADULT - HOSPITAL COURSE
HPI:  Pt is a 55 y/o F with PMHx HTN, asthma, states woke up with acute sharp stabbing chest pain that radiates to her upper left arm and upper back. Likely atypical chest pain due to pain on inspiration. D-dimer elevated at 252, CTA chest neg,  Trops elevated at 0.382 and repeat 0.742, pt to be transferred to [facility] for emergent angio due to r/o ischemia. Patient asymptomatic at this time. Initial EKG showed sinus rhythm with short NC otherwise normal, repeat EKG showed NSR with no abnormalities. CXR negative. (14 Feb 2019 16:01)  cardiac cath- normal

## 2019-02-14 NOTE — ED PROVIDER NOTE - NSRISKOFTRANSFER_ED_A_ED
Death or Disability/Deterioration of Condition En Route/Increased Pain/Transportation Risk (There is always a risk of traffic delays resulting in deterioration of condition.)

## 2019-02-14 NOTE — ED PROVIDER NOTE - PHYSICAL EXAMINATION
Constitutional : Appears comfortably, talking in full sentences  occasionally holds chest when breathing  Head :NC AT , no swelling  Eyes :eomi, no swelling  Mouth :mm moist,  Neck : supple, trachea in midline  Chest :Jhoan air entry, symm chest expansion, no distress  no wheezing  Heart :S1 S2 distant  Abdomen :abd soft, non tender  Musc/Skel :ext no swelling, no deformity, no spine tenderness, distal pulses present  Neuro  :AAO 3 no focal deficits

## 2019-02-14 NOTE — ED PROVIDER NOTE - OBJECTIVE STATEMENT
54y old from home, with h/o htn, states woke up with acute chest pain, sharp, stabbing, feels in back, no radiation, previous h/o asthma, last stress test less than 6months ago, no nausea, no vomiting, was sob, aggravated with inspiration, no relieving factors, was wheezing on ems arrival recd a breathing treatment felt better, has had congestion, works as a nurse,   patient states she was previously seen as a chest pain, at that time first trop was positive, however repeat study was negative,

## 2019-02-14 NOTE — DISCHARGE NOTE ADULT - MEDICATION SUMMARY - MEDICATIONS TO TAKE
I will START or STAY ON the medications listed below when I get home from the hospital:    isoniazid 100 mg oral tablet  -- 1 tab(s) by mouth once a day  -- Indication: For preventative     albuterol with CFC 90 mcg/inh inhalation aerosol (obsolete)  -- inhaled 2 times a day, As Needed  -- Indication: For asthma    Norvasc 5 mg oral tablet  -- 1 tab(s) by mouth once a day  -- Indication: For htn    Singulair 10 mg oral tablet  -- 1 tab(s) by mouth once a day  -- Indication: For asthma

## 2019-02-14 NOTE — ED PROVIDER NOTE - PROGRESS NOTE DETAILS
cristofer (cards) seen piero ptstephanie xfer to [facility] for cath. he requests add plavix 600mg, no heparin needed

## 2019-02-14 NOTE — ED ADULT NURSE NOTE - OBJECTIVE STATEMENT
Patient alert and oriented X 3. Complaining of congestion, intermittent difficulty breathing and chest pain upon waking up at 4 am. Denies  nausea, vomiting, headache, dizziness and fever. Lungs clears bilaterally. Respirations even and not labored. Patient table to speak  in complete sentences. Patient given 1 duo neb by EMS.

## 2019-02-15 PROBLEM — I10 ESSENTIAL (PRIMARY) HYPERTENSION: Chronic | Status: INACTIVE | Noted: 2019-02-14 | Resolved: 2019-02-14

## 2019-02-15 PROBLEM — J45.909 UNSPECIFIED ASTHMA, UNCOMPLICATED: Chronic | Status: INACTIVE | Noted: 2019-02-14 | Resolved: 2019-02-14

## 2019-02-15 LAB
CULTURE RESULTS: SIGNIFICANT CHANGE UP
SPECIMEN SOURCE: SIGNIFICANT CHANGE UP

## 2020-04-09 NOTE — ED ADULT TRIAGE NOTE - RESPIRATORY RATE (BREATHS/MIN)
16 Methotrexate Counseling:  Patient counseled regarding adverse effects of methotrexate including but not limited to nausea, vomiting, abnormalities in liver function tests. Patients may develop mouth sores, rash, diarrhea, and abnormalities in blood counts. The patient understands that monitoring is required including LFT's and blood counts.  There is a rare possibility of scarring of the liver and lung problems that can occur when taking methotrexate. Persistent nausea, loss of appetite, pale stools, dark urine, cough, and shortness of breath should be reported immediately. Patient advised to discontinue methotrexate treatment at least three months before attempting to become pregnant.  I discussed the need for folate supplements while taking methotrexate.  These supplements can decrease side effects during methotrexate treatment. The patient verbalized understanding of the proper use and possible adverse effects of methotrexate.  All of the patient's questions and concerns were addressed.

## 2021-02-25 ENCOUNTER — NON-APPOINTMENT (OUTPATIENT)
Age: 56
End: 2021-02-25

## 2021-02-25 ENCOUNTER — APPOINTMENT (OUTPATIENT)
Dept: GASTROENTEROLOGY | Facility: CLINIC | Age: 56
End: 2021-02-25

## 2021-06-15 NOTE — ED PROVIDER NOTE - CONDITION AT DISCHARGE:
Satisfactory Modified Advancement Flap Text: The defect edges were debeveled with a #15 scalpel blade.  Given the location of the defect, shape of the defect and the proximity to free margins a modified advancement flap was deemed most appropriate.  Using a sterile surgical marker, an appropriate advancement flap was drawn incorporating the defect and placing the expected incisions within the relaxed skin tension lines where possible.    The area thus outlined was incised deep to adipose tissue with a #15 scalpel blade.  The skin margins were undermined to an appropriate distance in all directions utilizing iris scissors.

## 2022-10-17 ENCOUNTER — OUTPATIENT (OUTPATIENT)
Dept: OUTPATIENT SERVICES | Facility: HOSPITAL | Age: 57
LOS: 1 days | End: 2022-10-17

## 2022-10-17 ENCOUNTER — RESULT REVIEW (OUTPATIENT)
Age: 57
End: 2022-10-17

## 2022-10-17 ENCOUNTER — APPOINTMENT (OUTPATIENT)
Dept: RADIOLOGY | Facility: HOSPITAL | Age: 57
End: 2022-10-17

## 2022-10-17 DIAGNOSIS — R76.11 NONSPECIFIC REACTION TO TUBERCULIN SKIN TEST WITHOUT ACTIVE TUBERCULOSIS: ICD-10-CM

## 2022-10-17 PROCEDURE — 71046 X-RAY EXAM CHEST 2 VIEWS: CPT | Mod: 26

## 2022-10-20 PROBLEM — Z00.00 ENCOUNTER FOR PREVENTIVE HEALTH EXAMINATION: Status: ACTIVE | Noted: 2022-10-20

## 2022-12-21 ENCOUNTER — EMERGENCY (EMERGENCY)
Facility: HOSPITAL | Age: 57
LOS: 1 days | Discharge: HOME | End: 2022-12-21
Attending: INTERNAL MEDICINE | Admitting: INTERNAL MEDICINE
Payer: COMMERCIAL

## 2022-12-21 VITALS
HEIGHT: 66 IN | HEART RATE: 79 BPM | SYSTOLIC BLOOD PRESSURE: 164 MMHG | TEMPERATURE: 97 F | RESPIRATION RATE: 16 BRPM | OXYGEN SATURATION: 97 % | WEIGHT: 173.94 LBS | DIASTOLIC BLOOD PRESSURE: 80 MMHG

## 2022-12-21 PROCEDURE — 99284 EMERGENCY DEPT VISIT MOD MDM: CPT | Mod: 25

## 2022-12-21 PROCEDURE — 96372 THER/PROPH/DIAG INJ SC/IM: CPT

## 2022-12-21 PROCEDURE — 99053 MED SERV 10PM-8AM 24 HR FAC: CPT

## 2022-12-21 PROCEDURE — 72125 CT NECK SPINE W/O DYE: CPT | Mod: MA

## 2022-12-21 PROCEDURE — 72125 CT NECK SPINE W/O DYE: CPT | Mod: 26,MA

## 2022-12-21 PROCEDURE — 99284 EMERGENCY DEPT VISIT MOD MDM: CPT

## 2022-12-21 RX ORDER — KETOROLAC TROMETHAMINE 30 MG/ML
30 SYRINGE (ML) INJECTION ONCE
Refills: 0 | Status: DISCONTINUED | OUTPATIENT
Start: 2022-12-21 | End: 2022-12-21

## 2022-12-21 RX ORDER — OXYCODONE AND ACETAMINOPHEN 5; 325 MG/1; MG/1
1 TABLET ORAL ONCE
Refills: 0 | Status: DISCONTINUED | OUTPATIENT
Start: 2022-12-21 | End: 2022-12-21

## 2022-12-21 RX ORDER — DIAZEPAM 5 MG
1 TABLET ORAL
Qty: 15 | Refills: 0
Start: 2022-12-21 | End: 2022-12-25

## 2022-12-21 RX ORDER — DIAZEPAM 5 MG
5 TABLET ORAL ONCE
Refills: 0 | Status: DISCONTINUED | OUTPATIENT
Start: 2022-12-21 | End: 2022-12-21

## 2022-12-21 RX ORDER — LIDOCAINE 4 G/100G
1 CREAM TOPICAL ONCE
Refills: 0 | Status: COMPLETED | OUTPATIENT
Start: 2022-12-21 | End: 2022-12-21

## 2022-12-21 RX ORDER — LIDOCAINE 4 G/100G
1 CREAM TOPICAL
Qty: 20 | Refills: 0
Start: 2022-12-21 | End: 2022-12-30

## 2022-12-21 RX ORDER — IBUPROFEN 200 MG
1 TABLET ORAL
Qty: 40 | Refills: 0
Start: 2022-12-21 | End: 2022-12-30

## 2022-12-21 RX ADMIN — Medication 5 MILLIGRAM(S): at 08:28

## 2022-12-21 RX ADMIN — LIDOCAINE 1 PATCH: 4 CREAM TOPICAL at 08:28

## 2022-12-21 RX ADMIN — Medication 30 MILLIGRAM(S): at 08:28

## 2022-12-21 RX ADMIN — OXYCODONE AND ACETAMINOPHEN 1 TABLET(S): 5; 325 TABLET ORAL at 08:28

## 2022-12-21 NOTE — ED PROVIDER NOTE - PATIENT PORTAL LINK FT
You can access the FollowMyHealth Patient Portal offered by [facility] by registering at the following website: [email or web address]. By joining [email or web address] FollowMyHealth portal, you will also be able to view your health information using other applications (apps) compatible with our system.

## 2022-12-21 NOTE — ED PROVIDER NOTE - NSFOLLOWUPINSTRUCTIONS_ED_ALL_ED_FT
Follow up with your PMD within 1-2 days.  Rest, increase your fluids, advance your activity as tolerated.   Take all of your other medications as previously prescribed.   Worsening, continued or ANY new concerning symptoms return to the emergency department.  Motrin 600 mg every 6 hours as needed for the pain lidocaine patch 12 hours on 12 hours off and Valium 2 mg every 8 hours follow-up with orthopedic outpatient

## 2022-12-21 NOTE — ED PROVIDER NOTE - CARE PROVIDER_API CALL
95
Frederick Dowell)  Orthopaedic Sports Medicine; Orthopaedic Surgery  [address] [address]  Phone: [phone]  Fax: [phone]  Follow Up Time:

## 2022-12-21 NOTE — ED PROVIDER NOTE - OBJECTIVE STATEMENT
57-year-old female came to the emergency room with chief complaint of right-sided neck pain radiating down to the right upper extremity pain is worse on motion patient was seen in the emergency room few days ago and was prescribed Flexeril and given a shot of Toradol with no relief patient denies any trauma the pain has been there for 1 week it is constant and patient has no past medical history non-smoker

## 2022-12-21 NOTE — ED ADULT NURSE NOTE - OBJECTIVE STATEMENT
Pt a&ox3 ambulatory to ED c/o neck pain that is worse with movement x 1 week. Took motrin PTA with no relief.

## 2022-12-21 NOTE — ED ADULT NURSE NOTE - NS ED NURSE DC PT EDUCATION RESOURCES

## 2022-12-21 NOTE — ED PROVIDER NOTE - CLINICAL SUMMARY MEDICAL DECISION MAKING FREE TEXT BOX
57-year-old female came to the emergency room with chief complaint of right-sided neck pain radiating down to the right upper extremity pain is worse on motion patient was seen in the emergency room few days ago and was prescribed Flexeril and given a shot of Toradol with no relief patient denies any trauma the pain has been there for 1 week it is constant and patient has no past medical history non-smoker  Decreased range of motion of the C-spine  right paraspinal tenderness in the neck area patient treated in the emergency room with Valium 5 mg Toradol 30 mg IM lidocaine patch and Percocet 1 tablet and a CT of the C-spine ordered 57-year-old female came to the emergency room with chief complaint of right-sided neck pain radiating down to the right upper extremity pain is worse on motion patient was seen in the emergency room few days ago and was prescribed Flexeril and given a shot of Toradol with no relief patient denies any trauma the pain has been there for 1 week it is constant and patient has no past medical history non-smoker  Decreased range of motion of the C-spine  right paraspinal tenderness in the neck area patient treated in the emergency room with Valium 5 mg Toradol 30 mg IM lidocaine patch and Percocet 1 tablet and a CT of the C-spine ordered  Patient's symptoms significantly significantly improved range of motion of the C-spine is better patient will be discharged on Valium 2 mg 3 times a day Motrin 600 mg 4 times a day and lidocaine patch 12 hours on 12 hours off follow-up with orthopedics outpatient

## 2022-12-21 NOTE — ED PROVIDER NOTE - PHYSICAL EXAMINATION
General:     NAD, well-nourished, well-appearing  Head:     NC/AT, EOMI, oral mucosa moist  Neck:     trachea midline Positive paraspinal right neck tenderness and right  supra scapular tenderness decreased range of motion on rotation to the right flexion and extension mild decrease in rotation on the left side neurovascular of the right upper extremity is intact  Lungs:     CTA b/l, no w/r/r  CVS:     S1S2, RRR, no m/g/r  Abd:     +BS, s/nt/nd, no organomegaly  Ext:    2+ radial and pedal pulses, no c/c/e  Neuro: AAOx3, no sensory/motor deficits

## 2024-07-03 NOTE — ED PROVIDER NOTE - SKIN, MLM
07/03/2024    Form: [employer]    Was the fax completed successfully? Yes    Was paperwork placed in file cabinet at ? Yes  
Skin normal color for race, warm, dry and intact. No evidence of rash.

## 2024-09-16 ENCOUNTER — NON-APPOINTMENT (OUTPATIENT)
Age: 59
End: 2024-09-16

## 2025-05-22 NOTE — H&P CARDIOLOGY - PMH
[de-identified] : Ms. TO is a 30 year-old female who presents with small cystic lesion in her left ear canal.  She saw Dr. Patterson 3 weeks ago for vertigo, which she says has improved.  She noted that cerumen was removed from both ears.  On follow up with her PCP, she was found to have a small cyst in her left ear canal.  She denies pain, no discharge, no hearing loss.
Asthma    HTN (hypertension)